# Patient Record
Sex: FEMALE | Race: WHITE | Employment: FULL TIME | URBAN - METROPOLITAN AREA
[De-identification: names, ages, dates, MRNs, and addresses within clinical notes are randomized per-mention and may not be internally consistent; named-entity substitution may affect disease eponyms.]

---

## 2020-10-16 ENCOUNTER — HOSPITAL ENCOUNTER (OUTPATIENT)
Dept: LAB | Age: 35
Discharge: HOME | End: 2020-10-16
Payer: COMMERCIAL

## 2020-10-16 PROCEDURE — 88175 CYTOPATH C/V AUTO FLUID REDO: CPT

## 2020-10-16 PROCEDURE — 87624 HPV HI-RISK TYP POOLED RSLT: CPT

## 2021-11-12 ENCOUNTER — IMPORTED ENCOUNTER (OUTPATIENT)
Dept: URBAN - METROPOLITAN AREA CLINIC 1 | Facility: CLINIC | Age: 36
End: 2021-11-12

## 2021-11-12 PROBLEM — H52.223: Noted: 2021-11-12

## 2021-11-12 PROBLEM — H52.13: Noted: 2021-11-12

## 2021-11-12 PROCEDURE — S0620 ROUTINE OPHTHALMOLOGICAL EXA: HCPCS

## 2021-11-12 NOTE — PATIENT DISCUSSION
1. Myopia w/ Astigmatism OU -- Rx was given for correction if indicated and requested. CTL trials to be ordered by optical. Return for an appointment in 1 week I&R/CC with Dr. Winter Prieto.

## 2022-01-10 ENCOUNTER — IMPORTED ENCOUNTER (OUTPATIENT)
Dept: URBAN - METROPOLITAN AREA CLINIC 1 | Facility: CLINIC | Age: 37
End: 2022-01-10

## 2022-01-10 NOTE — PATIENT DISCUSSION
1.  CC today -- patient happy with vision and comfort. CTL RX finalized and given to patient today. Return for an appointment in 1 year 40/cc with Dr. Tawanda Luis.

## 2022-04-02 ASSESSMENT — VISUAL ACUITY
OS_SC: J1+
OD_SC: J1+
OS_CC: 20/20
OS_SC: 20/15-1
OD_SC: 20/15
OD_CC: 20/25-1

## 2022-04-02 ASSESSMENT — TONOMETRY
OD_IOP_MMHG: 14
OS_IOP_MMHG: 14

## 2023-01-11 ENCOUNTER — COMPREHENSIVE EXAM (OUTPATIENT)
Dept: URBAN - METROPOLITAN AREA CLINIC 1 | Facility: CLINIC | Age: 38
End: 2023-01-11

## 2023-01-11 DIAGNOSIS — Z01.00: ICD-10-CM

## 2023-01-11 PROCEDURE — 92310-3 LEVEL 3 CONTACT LENS MANAGEMENT

## 2023-01-11 PROCEDURE — 92014 COMPRE OPH EXAM EST PT 1/>: CPT

## 2023-01-11 PROCEDURE — 92015 DETERMINE REFRACTIVE STATE: CPT

## 2023-01-11 ASSESSMENT — VISUAL ACUITY
OD_SC: 20/30-2
OS_SC: 20/25

## 2023-01-11 ASSESSMENT — TONOMETRY
OD_IOP_MMHG: 19
OS_IOP_MMHG: 19